# Patient Record
Sex: MALE | Race: WHITE | URBAN - METROPOLITAN AREA
[De-identification: names, ages, dates, MRNs, and addresses within clinical notes are randomized per-mention and may not be internally consistent; named-entity substitution may affect disease eponyms.]

---

## 2022-07-23 ENCOUNTER — HOSPITAL ENCOUNTER (EMERGENCY)
Age: 37
Discharge: HOME | End: 2022-07-23
Attending: STUDENT IN AN ORGANIZED HEALTH CARE EDUCATION/TRAINING PROGRAM

## 2022-07-23 VITALS
HEIGHT: 67 IN | RESPIRATION RATE: 18 BRPM | TEMPERATURE: 97.8 F | BODY MASS INDEX: 22.76 KG/M2 | HEART RATE: 110 BPM | WEIGHT: 145 LBS

## 2022-07-23 DIAGNOSIS — S61.451A ANIMAL BITE OF RIGHT HAND, INITIAL ENCOUNTER: Primary | ICD-10-CM

## 2022-07-23 PROCEDURE — 99283 EMERGENCY DEPT VISIT LOW MDM: CPT

## 2022-07-23 RX ORDER — AMOXICILLIN AND CLAVULANATE POTASSIUM 875; 125 MG/1; MG/1
1 TABLET, FILM COATED ORAL 2 TIMES DAILY
Qty: 10 TABLET | Refills: 0 | Status: SHIPPED | OUTPATIENT
Start: 2022-07-23 | End: 2022-07-28

## 2022-07-24 RX ORDER — AMOXICILLIN AND CLAVULANATE POTASSIUM 875; 125 MG/1; MG/1
1 TABLET, FILM COATED ORAL 2 TIMES DAILY
Qty: 10 TABLET | Refills: 0 | Status: SHIPPED | OUTPATIENT
Start: 2022-07-24 | End: 2022-07-29

## 2022-07-24 NOTE — ED TRIAGE NOTES
Otter bite to R 5th finger/side of hand. Bleeding well controlled, no numbness/tingling noted. Lat tetnus approx 3 yrs ago.

## 2022-07-24 NOTE — DISCHARGE INSTRUCTIONS
Keep wound clean and dry  Clean with soapy water. Pat dry do not rub dry.   Apply bacitracin/Neosporin to the wound twice daily for the next 2 to 3 days  You will be placed on an oral antibiotic to prevent infection  Any signs of redness, swelling, pus draining from the wound, fever return to ER immediately or see PCP

## 2022-07-24 NOTE — ED PROVIDER NOTES
EMERGENCY DEPARTMENT HISTORY AND PHYSICAL EXAM    Date: 7/23/2022  Patient Name: Harmony Palencia    History of Presenting Illness     Time Seen:10:25 PM    Chief Complaint   Patient presents with    Animal Bite       History Provided By: Patient    Additional History (Context):   Harmony Palencia is a 40 y.o. male who presents to the emergency room with animal bite to the right hand (dominant hand). Patient states that [place] feeding a wild otter crabs. Got too close to the outer. Bit him on the right hand. Sustained superficial abrasions to the right hand. Patient was advised to come here by his mother (works here) for evaluation. No active bleeding. No pain. No bruising or swelling to the area. Last tetanus shot was within the last 5 years. No other injuries. PCP: None        Past History     Past Medical History:  No past medical history on file. Past Surgical History:  No past surgical history on file. Family History:  No family history on file. Social History: Allergies:  No Known Allergies      Review of Systems   Review of Systems   Constitutional:         Intoxicated   Skin:  Positive for wound. Bite wound right hand   All other systems reviewed and are negative. Physical Exam     Vitals:    07/23/22 2220   Pulse: (!) 110   Resp: 18   Temp: 97.8 °F (36.6 °C)   Weight: 65.8 kg (145 lb)   Height: 5' 7\" (1.702 m)     Physical Exam  Vitals and nursing note reviewed. Constitutional:       General: He is not in acute distress. Appearance: Normal appearance. He is well-developed, well-groomed and normal weight. He is not ill-appearing. Comments: Intoxicated   Musculoskeletal:      Right hand: No swelling, deformity, lacerations, tenderness or bony tenderness. Normal range of motion. Normal sensation. Normal capillary refill.       Comments: Right hand -superficial abrasions noted to the lateral aspect of the right hand ulnar aspect. No active bleeding. No evidence of puncture wounds. No soft tissue swelling or bruising. Full range of motion all fingers. Neurovascular intact distally. Skin:     General: Skin is warm and dry. Capillary Refill: Capillary refill takes less than 2 seconds. Neurological:      Mental Status: He is alert and oriented to person, place, and time. Psychiatric:         Behavior: Behavior is cooperative. Nursing note and vitals reviewed      Diagnostic Study Results     Labs -   No results found for this or any previous visit (from the past 24 hour(s)). Radiologic Studies   No orders to display     CT Results  (Last 48 hours)      None          CXR Results  (Last 48 hours)      None              Medical Decision Making   I am the first provider for this patient. I reviewed the vital signs, available nursing notes, past medical history, past surgical history, family history and social history. Vital Signs-Reviewed the patient's vital signs. Records Reviewed: Nursing Notes    DDX: Animal bite right hand    Procedures:  Procedures    ED Course:   Initial assessment performed. The patients presenting problems have been discussed, and they are in agreement with the care plan formulated and outlined with them. I have encouraged them to ask questions as they arise throughout their visit. ED Physician Discussion Note:   Superficial abrasions noted to the right hand. No evidence of any puncture wound. No active bleeding. Do not feel patient warrants rabies vaccination. However, we will cover him with some antibiotics due to animal bite. Patient is up-to-date with his tetanus. Diagnosis and Disposition       DISCHARGE NOTE:  Fang Catalan's  results have been reviewed with him. He has been counseled regarding his diagnosis, treatment, and plan.   He verbally conveys understanding and agreement of the signs, symptoms, diagnosis, treatment and prognosis and additionally agrees to follow up as discussed. He also agrees with the care-plan and conveys that all of his questions have been answered. I have also provided discharge instructions for him that include: educational information regarding their diagnosis and treatment, and list of reasons why they would want to return to the ED prior to their follow-up appointment, should his condition change. He has been provided with education for proper emergency department utilization. CLINICAL IMPRESSION:    1. Animal bite of right hand, initial encounter        PLAN:  1. D/C Home  2. Discharge Medication List as of 7/23/2022 10:50 PM        START taking these medications    Details   amoxicillin-clavulanate (Augmentin) 875-125 mg per tablet Take 1 Tablet by mouth two (2) times a day for 5 days. , Print, Disp-10 Tablet, R-0             3.   Follow-up Information       Follow up With Specialties Details Why Contact Info    Primary care provider        [facility] EMERGENCY DEPT Emergency Medicine  If symptoms worsen, As needed [address] [id number]  [phone]          ____________________________________     Please note that this dictation was completed with [employer], the computer voice recognition software. Quite often unanticipated grammatical, syntax, homophones, and other interpretive errors are inadvertently transcribed by the computer software. Please disregard these errors. Please excuse any errors that have escaped final proofreading.